# Patient Record
Sex: FEMALE | Race: WHITE | NOT HISPANIC OR LATINO | Employment: FULL TIME | URBAN - METROPOLITAN AREA
[De-identification: names, ages, dates, MRNs, and addresses within clinical notes are randomized per-mention and may not be internally consistent; named-entity substitution may affect disease eponyms.]

---

## 2024-01-10 ENCOUNTER — TELEMEDICINE (OUTPATIENT)
Dept: BEHAVIORAL HEALTH | Facility: CLINIC | Age: 44
End: 2024-01-10
Payer: COMMERCIAL

## 2024-01-10 DIAGNOSIS — F32.1 CURRENT MODERATE EPISODE OF MAJOR DEPRESSIVE DISORDER, UNSPECIFIED WHETHER RECURRENT (MULTI): Primary | ICD-10-CM

## 2024-01-10 PROCEDURE — 90837 PSYTX W PT 60 MINUTES: CPT | Performed by: PSYCHOLOGIST

## 2024-01-10 NOTE — PROGRESS NOTES
START TIME:1:05p  END TIME: 2p  TOTAL TIME FACE TO FACE: 55mins    Subjective   Patient ID: Tatum Lala is a 43 y.o. female who presents for   Problem List Items Addressed This Visit       Current moderate episode of major depressive disorder (CMS/HCC) - Primary   .    Virtual or Telephone Consent    An interactive audio and video telecommunication system which permits real time communications between the patient (at the originating site) and provider (at the distant site) was utilized to provide this telehealth service.   Verbal consent was requested and obtained from Tatum Lala on this date, 01/10/24 for a telehealth visit.      CONTENT OF SESSION: this was followup appointment (session 2). Previous session was a couples visit for psychological consult related to third party reproduction.  Tatum was present alone this session.  Focus of session was on her presenting concern of coping with infertility and associated treatment.  She had her tenth round of IVF; and did not proceed to transfer. She and Jaspal are now considering either no further pursuit of infertility treatment or using donor egg.    Tatum reported symptoms consistent with major depressive disorder, moderate. She denied any suicidal ideation or intent. She reported experiencing persistent and pervasive low mood, sadness, anhedonia, avolition, and low energy. She has not been engaging in activities she usually would (e.g., seeing friends) and has been napping, which is outside the norm for her. These symptoms appear beyond grief reaction to infertility treatment.     She accepted referral to psychiatry division for medication consult.    Provider gave psychoeducation on values guided action and the behavioral activation model from CBT for depression (handouts provided).    Objective   Social History     Substance and Sexual Activity   Alcohol Use Not on file      Social History     Social History Narrative    Not on file        Assessment/Plan    Problem List Items Addressed This Visit       Current moderate episode of major depressive disorder (CMS/HCC) - Primary     PLAN: Followup in 2-4 weeks for cbt for depression and coping with infertility.  Referral placed to Dr. Percy Gustafson for medication consult/management.  PRACTICE EXERCISES PROVIDED: read handout provided on behavioral activation. Read chapter provided. Complete values card sort.

## 2024-01-29 ENCOUNTER — TELEMEDICINE (OUTPATIENT)
Dept: BEHAVIORAL HEALTH | Facility: CLINIC | Age: 44
End: 2024-01-29
Payer: COMMERCIAL

## 2024-01-29 DIAGNOSIS — F32.1 CURRENT MODERATE EPISODE OF MAJOR DEPRESSIVE DISORDER, UNSPECIFIED WHETHER RECURRENT (MULTI): Primary | ICD-10-CM

## 2024-01-29 PROCEDURE — 90837 PSYTX W PT 60 MINUTES: CPT | Mod: 95 | Performed by: PSYCHOLOGIST

## 2024-01-29 NOTE — PROGRESS NOTES
START TIME: 11:07  END TIME: 12:02p  TOTAL TIME FACE TO FACE: 55min    Subjective   Patient ID: Tatum Lala is a 43 y.o. female who presents for   Problem List Items Addressed This Visit       Current moderate episode of major depressive disorder (CMS/HCC) - Primary   .    Virtual or Telephone Consent    An interactive audio and video telecommunication system which permits real time communications between the patient (at the originating site) and provider (at the distant site) was utilized to provide this telehealth service.   Verbal consent was requested and obtained from Tatum Lala on this date, 01/29/24 for a telehealth visit.      CONTENT OF SESSION: this was followup appointment (session 3). This was individual CBT session. Focus of session was on Tatum's adjustment in context of stressors including infertility and associated treatment.  She discussed her completion of the values card sort exercise provided previous session - identified inner peace and health as two top values. Time was also spent on discussion of interpersonal relationships. Provider gave supportive counseling and normalized common emotional experiences. Provider gave psychoeducation on assertive communication including DEAR MAN model (hand out provided).    Objective   Social History     Substance and Sexual Activity   Alcohol Use Not on file      Social History     Social History Narrative    Not on file        Assessment/Plan   Problem List Items Addressed This Visit       Current moderate episode of major depressive disorder (CMS/HCC) - Primary     PLAN: Followup in 2-4 weeks for cbt for depression and coping with infertility.  Referral placed to Dr. Percy Gustafson for medication consult/management.  PRACTICE EXERCISES PROVIDED: read handout provided on behavioral activation. Read chapter provided. Complete values card sort. Read handout on DEAR MAN communication model.

## 2024-01-31 ENCOUNTER — TELEMEDICINE (OUTPATIENT)
Dept: BEHAVIORAL HEALTH | Facility: CLINIC | Age: 44
End: 2024-01-31
Payer: COMMERCIAL

## 2024-01-31 DIAGNOSIS — F32.81 PMDD (PREMENSTRUAL DYSPHORIC DISORDER): ICD-10-CM

## 2024-01-31 DIAGNOSIS — F43.10 PTSD (POST-TRAUMATIC STRESS DISORDER): ICD-10-CM

## 2024-01-31 PROCEDURE — 99204 OFFICE O/P NEW MOD 45 MIN: CPT | Performed by: STUDENT IN AN ORGANIZED HEALTH CARE EDUCATION/TRAINING PROGRAM

## 2024-01-31 RX ORDER — FLUOXETINE HYDROCHLORIDE 20 MG/1
20 CAPSULE ORAL DAILY
Qty: 30 CAPSULE | Refills: 1 | Status: SHIPPED | OUTPATIENT
Start: 2024-01-31 | End: 2024-02-23

## 2024-01-31 NOTE — PROGRESS NOTES
Subjective    All Individuals Present: Patient and Provider (Encounter Provider)     ID: Tatum Lala is a 43 y.o. female presenting for consultation for PMDD.     Interval History/HPI/PFSH:     woman presenting after multiple rounds of unsuccessful IVF.    Significant frustration, anxiety, guilt around inability to carry a pregnancy to term.    Has never seen psychiatry before, only recently began seeing Dr. Blackwell for therapy through [facility]. Has only ever been on PRN alprazolam for specific situations before as prescribed by PCP. Never took with regularity because was too sedating. Otherwise has never had psychiatric treatment.      PMS:  -clomid made feel SI  -estrace felt beneficial to mood  -had migraines without aura on combo OCPs    -premenstrually  -bad HA the day before  -5 days before is when irritability starts, resolves day after period  -previously tried tea Women's Moon Cycle, helped slightly  -only cramps, otherwise minimal physical sx    LMP ended 24  Uses Aura ring to help cycle; uses Huey          [facility] History:  1st stim- cancelled      1st Retrieval 10/21: (Agonist Flare) 5 retrieved, 3 mature, 2 fertilized, 1 blast fzâ€“ no pgt-a  Egg Quality: no notations made     FZET  of 1 blast from above retrieval, NPG     2nd Retrieval : (Antagonist Suppression) 6 Retriefved, 4 mature, 3 fertilized, 1 blast fz- no pgt-a  Egg Quality: Multiple fragmented PBs in some oocytes; misshapen oocytes     3rd Retrieval : (Agonist Flare) 5 retrieved, 3 mature, 2 fertilized, 1 BL â€“ no pgt-a     FZET  â€“ Chemical PG of 2 above blasts fz     4th Retrieval : (Antagonist Suppression) 4 Retrieved, 0 mature eggs, in-vitro matured for Day 2 ICSI 3 eggs, 2 fertilized, 0 blasts  Egg Quality: D2 ICSi Per RW and Patient at 1037. Fragile Membranes; mishapen oocytes.      5th Retrieval : (Antagonist Suppression) 3 Retrieved, 0 mature eggs at stripping, 1 matured in the afternoon  for ICSI, no fertilization  Egg Quality: immature eggs, no notations d/t maturity     6th Retrieval 10/22: (Antagonist Suppression) 4 Retrieved, 3 fertilized, 2 Aneuploid   Egg Quality: Degenerated fragmented PBs; grainy dark eggs     7th retrieval 01/23: (Antagonist Suppression) 3 retrieved, 3 fertilized, 1 Aneuploid  Egg Quality: Grainy Oocytes, Large PB w/ Fragmentation. Debris in PVS,     8th Retrieval: 01/23: (Antagonist Suppression) 3 retrieved, 1 fertilized , 0 Blasts  Egg Quality : Mishapen salvatore's, vacuoles, dark eggs, grainy eggs, large PVS, fragile membranes     Total stims: 9  Total retrievals: 8  Total transfers: 2  Total embryos transferred: 3 (untested)  Total embryos biopsied for PGTA: 3 (all aneuploid)     Medication side effects: None Reported    Denies SI, HI, AVH.     Review of Systems  Constitutional: Negative  Psychiatric: Positive for anxiety, irritability, and mood swings, Negative for decreased appetite, depression, excessive alcohol consumption, illegal drug usage, learning difficulty, and loss of interest in favorite activities  Neurological: Negative   Other: other systems reviewed and non-contributory    Past Psychiatric Hx:  -has had alprazolam before for situational panic; made sleepy  -otherwise no diagnoses, treatment  -no hospitalizations  -no suicide attempts nor self-harm    Past Medical Hx:  -H/O HPV, Ovarian cyst, Fibrocystic breasts, chronic UTI     Surgical Hx:  -Cone biopsy, ovarian cystectomy, breast cyst removed     Allergies:  -  Allergies   Allergen Reactions    Hazelnut Unknown        Family Medical History:  No family history on file.   -dad with hypothyroidism, CAD  -mom with breast cancer    Social:  Social History     Socioeconomic History    Marital status:      Spouse name: Not on file    Number of children: Not on file    Years of education: Not on file    Highest education level: Not on file   Occupational History    Not on file   Tobacco Use    Smoking  status: Not on file    Smokeless tobacco: Not on file   Substance and Sexual Activity    Alcohol use: Not on file    Drug use: Not on file    Sexual activity: Not on file   Other Topics Concern    Not on file   Social History Narrative    Not on file     Social Determinants of Health     Financial Resource Strain: Low Risk  (5/27/2023)    Received from [facility]    Overall Financial Resource Strain (CARDIA)     Difficulty of Paying Living Expenses: Not hard at all   Food Insecurity: No Food Insecurity (5/27/2023)    Received from [facility]    Hunger Vital Sign     Worried About Running Out of Food in the Last Year: Never true     Ran Out of Food in the Last Year: Never true   Transportation Needs: No Transportation Needs (5/27/2023)    Received from [facility]    PRAPARE - Transportation     Lack of Transportation (Medical): No     Lack of Transportation (Non-Medical): No   Physical Activity: Sufficiently Active (5/27/2023)    Received from [facility]    Exercise Vital Sign     Days of Exercise per Week: 5 days     Minutes of Exercise per Session: 40 min   Stress: No Stress Concern Present (5/27/2023)    Received from [facility]    [employer] [employer] - Occupational Stress Questionnaire     Feeling of Stress : Not at all   Social Connections: Moderately Integrated (5/27/2023)    Received from [facility]    Social Connection and Isolation Panel [NHANES]     Frequency of Communication with Friends and Family: More than three times a week     Frequency of Social Gatherings with Friends and Family: More than three times a week     Attends Ep[employer]opalian Services: Never     Active Member of Clubs or Organizations: Yes     Attends Club or Organization Meetings: More than 4 times per year     Marital Status: Living with partner   Intimate Partner Violence: Not on file   Housing Stability: Low Risk  (5/27/2023)    Received from [facility]    Housing Stability Vital  "Sign     Unable to Pay for Housing in the Last Year: No     Number of Places Lived in the Last Year: 1     Unstable Housing in the Last Year: No      Splits times between [place] and [place].    Works as an . Post-graduate education.    Substances:  -tobacco/nicotine: never  -EtOH: occasional, social, no binging nor repeat blackouts  -Illicits: none  -Caffeine: occasional      Objective   There were no vitals taken for this visit.  Wt Readings from Last 4 Encounters:   02/20/23 60.8 kg (134 lb)   01/21/23 60.9 kg (134 lb 4.2 oz)   01/10/23 61.1 kg (134 lb 11.2 oz)   10/11/22 61 kg (134 lb 7.7 oz)       Mental Status Exam  General Appearance: Well groomed, appropriate eye contact.  Attitude/Behavior: Cooperative, conversant, engaged, and with good eye contact.  Motor: No psychomotor agitation or retardation, no tremor or other abnormal movements.  Speech: Normal rate, volume, prosody  Gait/Station: Within normal limits  Mood: \"okay.\"  Affect: Euthymic, full-range and Congruent with mood and topic of conversation  Thought Process: Linear, goal directed  Thought Associations: No loosening of associations  Thought Content: normal  Sensorium: Alert and oriented to person, place, time and situation  Insight: Intact, as evidenced by awareness of symptom patterns  Judgment: Intact  Cognition: Cognitively intact to conversational testing with respect to attention, orientation, fund of knowledge, recent and remote memory, and language.  Testing: N/A.    Laboratory/Imaging/Diagnostic Tests       Assessment/Plan   Overall Formulation and Differential Diagnosis:  Tatum Laal is a 43 y.o. female who meets criteria for PMDD, provisional based on retrospective data. Will acquire prospective mood tracking data to help confirm diagnosis.    Interval Assessment:  Will try fluoxetine for PMDD symptoms.    -start fluoxetine 20 mg PO daily  -start prospectively mood tracking  -RTC 4 weeks    Risk Assessment:  Imminent Risk " of Suicide or Serious Self-Injury: Low Risk -- Risk factors include: Loss (relational, social, occupational, financial)  and Medical illness comorbidity  Protective factors include:Denies current suicidal ideation, Denies history of suicide attempts , Future-oriented talk , Willingness to seek help and support , Skills in problem solving, conflict resolution, and nonviolent handling of disputes, Cultural and Christianity beliefs that discourage suicide and support self-preservation , Access to a variety of clinical interventions , Receiving and engaged in care for mental, physical, and substance use disorders , History of adhering to treatment recommendations and/or prescribed medication regimen , Support through ongoing medical and mental healthcare relationships , Current/history of good response to treatment/meds , Interpersonal relationships and supports, e.g., family, friends, peers, community , and Restricted access to firearms or other lethal means of suicide   Imminent Risk of Violence or Homicide: Low Risk - Risk factors include: No significant risk factors identified on screening. Protective factors include: Lack of known history of harm to others , Lack of known history of violent ideation , Lack of known access to firearms , Sense of community, availability/access to resources and support , Sense of optimism, hope , Interpersonal competence , Affect regulation , Sense of self-efficacy, internal locus of control , and Positive, pro-social family/peer network   Treatment Plan:  There are no recently modified care plans to display for this patient.      Attestation Statements   Topics (in addition those noted above): Diagnostic impressions, Instructions for treatment , Patient education , Prognosis , Risks and benefits of treatments , and Side effects of medications

## 2024-02-13 ENCOUNTER — TELEMEDICINE (OUTPATIENT)
Dept: BEHAVIORAL HEALTH | Facility: CLINIC | Age: 44
End: 2024-02-13
Payer: COMMERCIAL

## 2024-02-13 DIAGNOSIS — F32.1 CURRENT MODERATE EPISODE OF MAJOR DEPRESSIVE DISORDER, UNSPECIFIED WHETHER RECURRENT (MULTI): Primary | ICD-10-CM

## 2024-02-13 PROCEDURE — 90837 PSYTX W PT 60 MINUTES: CPT | Mod: 95 | Performed by: PSYCHOLOGIST

## 2024-02-13 NOTE — PROGRESS NOTES
START TIME: 8:03  END TIME: 9  TOTAL TIME FACE TO FACE: 57 minutes    Subjective   Patient ID: Tatum Lala is a 43 y.o. female who presents for   Problem List Items Addressed This Visit       Current moderate episode of major depressive disorder (CMS/HCC) - Primary   .    Virtual or Telephone Consent    An interactive audio and video telecommunication system which permits real time communications between the patient (at the originating site) and provider (at the distant site) was utilized to provide this telehealth service.   Verbal consent was requested and obtained from Tatum Lala on this date, 02/13/24 for a telehealth visit.      CONTENT OF SESSION:  this was followup appointment (session 4). This was individual CBT session. Focus of session was on Tatum's adjustment in context of stressors including infertility and associated treatment.  Tatum described her use of recent assertive communication and boundary setting and provider gave positive feedback about this skill use. Time was also spent on discussion of Tatum's decision making and she identified relevant personal options for solutions, pros/cons, and relevant values. Provider gave supportive counseling and normalized common emotional experiences. Provider gave psychoeducation on common psychological reactions to infertility and adjustment in parenthood.       Objective   Social History     Substance and Sexual Activity   Alcohol Use Not on file      Social History     Social History Narrative    Not on file        Assessment/Plan   Problem List Items Addressed This Visit       Current moderate episode of major depressive disorder (CMS/HCC) - Primary     PLAN: Followup in 2-4 weeks for cbt for depression and coping with infertility.  Referral placed to Dr. Percy Gustafson for medication consult/management.  PRACTICE EXERCISES PROVIDED: read handout provided on behavioral activation. Read chapter provided. Complete values card sort. Read handout on DEAR  MAN communication model.

## 2024-02-22 DIAGNOSIS — F43.10 PTSD (POST-TRAUMATIC STRESS DISORDER): ICD-10-CM

## 2024-02-23 RX ORDER — FLUOXETINE HYDROCHLORIDE 20 MG/1
20 CAPSULE ORAL DAILY
Qty: 90 CAPSULE | Refills: 1 | Status: SHIPPED | OUTPATIENT
Start: 2024-02-23 | End: 2024-05-13 | Stop reason: SINTOL

## 2024-03-01 ENCOUNTER — APPOINTMENT (OUTPATIENT)
Dept: BEHAVIORAL HEALTH | Facility: CLINIC | Age: 44
End: 2024-03-01
Payer: COMMERCIAL

## 2024-03-06 ENCOUNTER — TELEMEDICINE (OUTPATIENT)
Dept: BEHAVIORAL HEALTH | Facility: CLINIC | Age: 44
End: 2024-03-06
Payer: COMMERCIAL

## 2024-03-06 DIAGNOSIS — F32.81 PMDD (PREMENSTRUAL DYSPHORIC DISORDER): ICD-10-CM

## 2024-03-06 DIAGNOSIS — F43.10 PTSD (POST-TRAUMATIC STRESS DISORDER): ICD-10-CM

## 2024-03-06 PROCEDURE — 99214 OFFICE O/P EST MOD 30 MIN: CPT | Performed by: STUDENT IN AN ORGANIZED HEALTH CARE EDUCATION/TRAINING PROGRAM

## 2024-03-06 RX ORDER — ALPRAZOLAM 0.5 MG/1
0.5 TABLET ORAL 3 TIMES DAILY PRN
Qty: 10 TABLET | Refills: 2 | Status: SHIPPED | OUTPATIENT
Start: 2024-03-06 | End: 2024-06-10 | Stop reason: SDUPTHER

## 2024-03-06 NOTE — PROGRESS NOTES
Subjective    All Individuals Present: Patient and Provider (Encounter Provider)     ID: Tatum Lala is a 43 y.o. female presenting for consultation for PMDD.     Interval History/HPI/PFSH:  Started prozac day 18 of cycle, got menses day 27.    Did get diarrhea from fluoxetine. Switched dosing to night, has woken up every night ~2:00 a.m. Sleep resolved immediately when stopped taking.    Felt normal for the days she took fluoxetine and no mood fluctuations, wants to stay on.    Stress with 3 senior dogs, 2 having major surgery in past 2 weeks. Had to use alprazolam 0.5 mg, slept extremely well.    Traveling to [facility] in a few weeks.      PMS:  -clomid made feel SI  -estrace felt beneficial to mood  -had migraines without aura on combo OCPs    -premenstrually  -bad HA the day before  -5 days before is when irritability starts, resolves day after period  -previously tried tea Women's Moon Cycle, helped slightly  -only cramps, otherwise minimal physical sx    LMP ended 1/29/24  Uses Aura ring to help cycle; uses Huey       Medication side effects:  diarrhea, insomnia    Denies SI, HI, AVH.     Review of Systems  Constitutional: Negative  Psychiatric: Positive for anxiety, irritability, and mood swings, Negative for decreased appetite, depression, excessive alcohol consumption, illegal drug usage, learning difficulty, and loss of interest in favorite activities  Neurological: Negative   Other: other systems reviewed and non-contributory    Objective   There were no vitals taken for this visit.  Wt Readings from Last 4 Encounters:   02/20/23 60.8 kg (134 lb)   01/21/23 60.9 kg (134 lb 4.2 oz)   01/10/23 61.1 kg (134 lb 11.2 oz)   10/11/22 61 kg (134 lb 7.7 oz)       Mental Status Exam  General Appearance: Well groomed, appropriate eye contact.  Attitude/Behavior: Cooperative, conversant, engaged, and with good eye contact.  Motor: No psychomotor agitation or retardation, no tremor or other abnormal  "movements.  Speech: Normal rate, volume, prosody  Gait/Station: Within normal limits  Mood: \"normal\"  Affect: Euthymic, full-range and Congruent with mood and topic of conversation  Thought Process: Linear, goal directed  Thought Associations: No loosening of associations  Thought Content: normal  Sensorium: Alert and oriented to person, place, time and situation  Insight: Intact, as evidenced by awareness of symptom patterns  Judgment: Intact  Cognition: Cognitively intact to conversational testing with respect to attention, orientation, fund of knowledge, recent and remote memory, and language.  Testing: N/A.    Laboratory/Imaging/Diagnostic Tests       Assessment/Plan   Overall Formulation and Differential Diagnosis:  Tatum Lala is a 43 y.o. female who meets criteria for PMDD, provisional based on retrospective data. Will acquire prospective mood tracking data to help confirm diagnosis.    Interval Assessment:  Will try fluoxetine for PMDD symptoms, switch dosing to morning to see if improves sleep.    -continue fluoxetine 20 mg PO daily, switch dosing to morning  -continue prospectively mood tracking  -RTC 4 weeks    Risk Assessment:  Imminent Risk of Suicide or Serious Self-Injury: Low Risk -- Risk factors include: Loss (relational, social, occupational, financial)  and Medical illness comorbidity  Protective factors include:Denies current suicidal ideation, Denies history of suicide attempts , Future-oriented talk , Willingness to seek help and support , Skills in problem solving, conflict resolution, and nonviolent handling of disputes, Cultural and Taoist beliefs that discourage suicide and support self-preservation , Access to a variety of clinical interventions , Receiving and engaged in care for mental, physical, and substance use disorders , History of adhering to treatment recommendations and/or prescribed medication regimen , Support through ongoing medical and mental healthcare relationships , " Current/history of good response to treatment/meds , Interpersonal relationships and supports, e.g., family, friends, peers, community , and Restricted access to firearms or other lethal means of suicide   Imminent Risk of Violence or Homicide: Low Risk - Risk factors include: No significant risk factors identified on screening. Protective factors include: Lack of known history of harm to others , Lack of known history of violent ideation , Lack of known access to firearms , Sense of community, availability/access to resources and support , Sense of optimism, hope , Interpersonal competence , Affect regulation , Sense of self-efficacy, internal locus of control , and Positive, pro-social family/peer network   Treatment Plan:  There are no recently modified care plans to display for this patient.      Attestation Statements   Topics (in addition those noted above): Diagnostic impressions, Instructions for treatment , Patient education , Prognosis , Risks and benefits of treatments , and Side effects of medications

## 2024-03-28 ENCOUNTER — TELEMEDICINE (OUTPATIENT)
Dept: BEHAVIORAL HEALTH | Facility: CLINIC | Age: 44
End: 2024-03-28
Payer: COMMERCIAL

## 2024-03-28 DIAGNOSIS — F32.1 CURRENT MODERATE EPISODE OF MAJOR DEPRESSIVE DISORDER, UNSPECIFIED WHETHER RECURRENT (MULTI): Primary | ICD-10-CM

## 2024-03-28 PROCEDURE — 90837 PSYTX W PT 60 MINUTES: CPT | Performed by: PSYCHOLOGIST

## 2024-03-28 NOTE — PROGRESS NOTES
START TIME: 11:05  END TIME: 12  TOTAL TIME FACE TO FACE: 55mins    Subjective   Patient ID: Tatum Lala is a 43 y.o. female who presents for   Problem List Items Addressed This Visit       Current moderate episode of major depressive disorder (CMS/HCC) - Primary   .    Virtual or Telephone Consent    An interactive audio and video telecommunication system which permits real time communications between the patient (at the originating site) and provider (at the distant site) was utilized to provide this telehealth service.   Verbal consent was requested and obtained from Tatum Lala on this date, 03/28/24 for a telehealth visit.      CONTENT OF SESSION: this was followup appointment (session 5). This was individual CBT session.   Focus of session was on Tatum's adjustment in context of impact of infertility and associated treatment. Tatum discussed her experience with normative grief response related to infertility treatment and with the idea of potentially not being able to use her own eggs. She acknowledged some worry thoughts; Provider used Socratic questioning to facilitate cognitive restructuring of potential unrealistic or unhelpful cognitions and patient was able to engage in cognitive restructuring. Provider also gave further psychoeducation on common psychological reactions to infertility treatment. Time was also spent discussing possible strategies of cognitive restructuring of unhelpful or negative cognitions and Tatum was able to identify alternatives.  Provider gave supportive counseling and normalized common emotional experiences.  Objective   Social History     Substance and Sexual Activity   Alcohol Use Not on file      Social History     Social History Narrative    Not on file        Assessment/Plan   Problem List Items Addressed This Visit       Current moderate episode of major depressive disorder (CMS/HCC) - Primary     PLAN: Followup in 2-4 weeks for cbt for depression and coping with  infertility.  Referral placed to Dr. Percy Gustafson for medication consult/management.  PRACTICE EXERCISES PROVIDED: read handout provided on behavioral activation. Read chapter provided. Complete values card sort. Read handout on DEAR MAN communication model.

## 2024-04-12 ENCOUNTER — APPOINTMENT (OUTPATIENT)
Dept: BEHAVIORAL HEALTH | Facility: CLINIC | Age: 44
End: 2024-04-12
Payer: COMMERCIAL

## 2024-05-13 DIAGNOSIS — F43.10 PTSD (POST-TRAUMATIC STRESS DISORDER): ICD-10-CM

## 2024-05-13 RX ORDER — FLUOXETINE 10 MG/1
10 CAPSULE ORAL DAILY
Qty: 30 CAPSULE | Refills: 1 | Status: SHIPPED | OUTPATIENT
Start: 2024-05-13 | End: 2024-06-10 | Stop reason: SDUPTHER

## 2024-06-08 DIAGNOSIS — F43.10 PTSD (POST-TRAUMATIC STRESS DISORDER): ICD-10-CM

## 2024-06-10 DIAGNOSIS — F43.10 PTSD (POST-TRAUMATIC STRESS DISORDER): ICD-10-CM

## 2024-06-10 RX ORDER — ALPRAZOLAM 0.5 MG/1
0.5 TABLET ORAL 3 TIMES DAILY PRN
Qty: 10 TABLET | Refills: 2 | Status: SHIPPED | OUTPATIENT
Start: 2024-06-10 | End: 2024-09-08

## 2024-06-10 RX ORDER — FLUOXETINE 10 MG/1
10 CAPSULE ORAL DAILY
Qty: 90 CAPSULE | Refills: 1 | OUTPATIENT
Start: 2024-06-10

## 2024-06-10 RX ORDER — FLUOXETINE 10 MG/1
10 CAPSULE ORAL DAILY
Qty: 90 CAPSULE | Refills: 3 | Status: SHIPPED | OUTPATIENT
Start: 2024-06-10 | End: 2025-06-10

## 2024-07-09 ENCOUNTER — TELEMEDICINE (OUTPATIENT)
Dept: BEHAVIORAL HEALTH | Facility: CLINIC | Age: 44
End: 2024-07-09
Payer: COMMERCIAL

## 2024-07-09 DIAGNOSIS — F32.1 CURRENT MODERATE EPISODE OF MAJOR DEPRESSIVE DISORDER, UNSPECIFIED WHETHER RECURRENT (MULTI): Primary | ICD-10-CM

## 2024-07-09 PROCEDURE — 90837 PSYTX W PT 60 MINUTES: CPT | Performed by: PSYCHOLOGIST

## 2024-07-09 NOTE — PROGRESS NOTES
START TIME: 8:05  END TIME: 9  TOTAL TIME FACE TO FACE: 55mins    Subjective   Patient ID: Tatum Lala is a 43 y.o. female who presents for   Problem List Items Addressed This Visit       Current moderate episode of major depressive disorder (Multi) - Primary   .    Virtual or Telephone Consent    An interactive audio and video telecommunication system which permits real time communications between the patient (at the originating site) and provider (at the distant site) was utilized to provide this telehealth service.   Verbal consent was requested and obtained from Tatum Lala on this date, 07/09/24 for a telehealth visit.      CONTENT OF SESSION: this was followup appointment (session 5). This was individual CBT session.   Focus of session was on Tatum's adjustment in context of impact of infertility and associated treatment, and associated depressive symptoms. Tatum reported overall positive response to intermittent fluoxetine managed by Dr. Gustafson for PMDD symptoms. Tatum noted some occasional difficulty sleeping (difficulty staying asleep) and provider gave psychoeducation on sleep hygiene skills. Tatum reported some distress in context of overall psychosocial stress (e.g., moving; major work events) and interpersonal stress. She described appropriate use of coping skills such as cognitive reframing and assertive communication skills. Provider gave positive feedback about this skill use. Time was also spent on problem solving around implementation of behavioral activation skills related to Tatum's goal to schedule pleasant events to look forward to. Provider gave supportive counseling and normalized common emotional experiences.    Objective   Social History     Substance and Sexual Activity   Alcohol Use Not on file      Social History     Social History Narrative    Not on file        Assessment/Plan   Problem List Items Addressed This Visit       Current moderate episode of major depressive disorder  (Multi) - Primary     PLAN: Followup in 2-4 weeks for cbt for depression and coping with infertility.  Referral placed to Dr. Percy Gustafson for medication consult/management.  PRACTICE EXERCISES PROVIDED: read handout provided on behavioral activation. Read chapter provided. Complete values card sort. Read handout on DEAR MAN communication model.

## 2024-08-05 ENCOUNTER — TELEMEDICINE (OUTPATIENT)
Dept: BEHAVIORAL HEALTH | Facility: CLINIC | Age: 44
End: 2024-08-05
Payer: COMMERCIAL

## 2024-08-05 DIAGNOSIS — F32.1 CURRENT MODERATE EPISODE OF MAJOR DEPRESSIVE DISORDER, UNSPECIFIED WHETHER RECURRENT (MULTI): Primary | ICD-10-CM

## 2024-08-05 PROCEDURE — 90834 PSYTX W PT 45 MINUTES: CPT | Performed by: PSYCHOLOGIST

## 2024-08-05 NOTE — PROGRESS NOTES
START TIME: 8:05  END TIME: 8:47  TOTAL TIME FACE TO FACE: 42 mins    Subjective   Patient ID: Tatum Lala is a 43 y.o. female who presents for   Problem List Items Addressed This Visit       Current moderate episode of major depressive disorder (Multi) - Primary   .    Virtual or Telephone Consent    An interactive audio and video telecommunication system which permits real time communications between the patient (at the originating site) and provider (at the distant site) was utilized to provide this telehealth service.   Verbal consent was requested and obtained from Tatum Lala on this date, 08/05/24 for a telehealth visit.      CONTENT OF SESSION: this was followup appointment (session 6). This was individual CBT session.   Focus of session was on Tatum's adjustment in context of impact of infertility and associated treatment, and associated depressive symptoms. Tatum reported overall positive response to intermittent fluoxetine managed by Dr. Gustafson for PMDD symptoms. Tatum noted some occasional difficulty sleeping (difficulty staying asleep) and provider gave psychoeducation on sleep hygiene skills- Tatum has been practicing minimizing time in bed and not asleep. She is still wakening earlier than desired.  She and Jaspal are moving into a new home soon.   Time was spent on emotional processing of recent interpersonal experience and trigger/reminder of infertility and related distress and reproductive loss. Time was spent on problem solving around strategies for further boundary setting. Provider gave psychoeduccation on role of cognition in mood and on rumination- discussed cognitive defusion and healthy distraction and mindfulness exercises to minimize rumination. Provider gave psychoeducation on common psychological reactions to infertility and reproductive loss.  Provider gave supportive counseling and normalized common emotional experiences.    Objective   Social History     Substance and Sexual  Activity   Alcohol Use Not on file      Social History     Social History Narrative    Not on file        Assessment/Plan   Problem List Items Addressed This Visit       Current moderate episode of major depressive disorder (Multi) - Primary     PLAN: Followup in 2-4 weeks for cbt for depression and coping with infertility.  Referral placed to Dr. Percy Gustafson for medication consult/management.  PRACTICE EXERCISES PROVIDED: read handout provided on behavioral activation. Read chapter provided. Complete values card sort. Read handout on DEAR MAN communication model.

## 2024-08-16 DIAGNOSIS — F32.81 PMDD (PREMENSTRUAL DYSPHORIC DISORDER): ICD-10-CM

## 2024-08-26 ENCOUNTER — TELEMEDICINE (OUTPATIENT)
Dept: BEHAVIORAL HEALTH | Facility: CLINIC | Age: 44
End: 2024-08-26
Payer: COMMERCIAL

## 2024-08-26 DIAGNOSIS — F32.1 CURRENT MODERATE EPISODE OF MAJOR DEPRESSIVE DISORDER, UNSPECIFIED WHETHER RECURRENT (MULTI): Primary | ICD-10-CM

## 2024-08-26 PROCEDURE — 90834 PSYTX W PT 45 MINUTES: CPT | Performed by: PSYCHOLOGIST

## 2024-08-26 NOTE — PROGRESS NOTES
START TIME: 8:10  END TIME: 9  TOTAL TIME FACE TO FACE: 50min    Subjective   Patient ID: Tatum Lala is a 43 y.o. female who presents for   Problem List Items Addressed This Visit       Current moderate episode of major depressive disorder (Multi) - Primary   .    Virtual or Telephone Consent    An interactive audio and video telecommunication system which permits real time communications between the patient (at the originating site) and provider (at the distant site) was utilized to provide this telehealth service.   Verbal consent was requested and obtained from Tatum Lala on this date, 08/26/24 for a telehealth visit.      CONTENT OF SESSION:  this was followup appointment (session 7). This was individual CBT session.   Focus of session was on Tatum's adjustment in context of impact of infertility and associated treatment, and associated depressive symptoms.   She has noticed symptoms she suspects may be perimenopause-related, including increased mood changes and body temperature regulation difficulties. Provider made referral to Justo Medrano for further evaluation. Time was also spent on emotional processing of impact of infertility on relationships- Tatum discussed recent experience of social disclosure of her infertility treatment history and had received some validating responses. Time was also spent on role play of assertive communication. Provider gave psychoeducation on common emotional and social reactions to infertility/infertility treatment.  Provider gave supportive counseling and normalized common emotional experiences.    Objective   Social History     Substance and Sexual Activity   Alcohol Use Not on file      Social History     Social History Narrative    Not on file        Assessment/Plan   Problem List Items Addressed This Visit       Current moderate episode of major depressive disorder (Multi) - Primary     PLAN: Followup in 2-4 weeks for cbt for depression and coping with infertility.   Referral placed to Dr. Percy Gustafson for medication consult/management.  PRACTICE EXERCISES PROVIDED: read handout provided on behavioral activation. Read chapter provided. Complete values card sort. Read handout on DEAR MAN communication model.

## 2024-09-09 ENCOUNTER — TELEMEDICINE (OUTPATIENT)
Dept: BEHAVIORAL HEALTH | Facility: CLINIC | Age: 44
End: 2024-09-09
Payer: COMMERCIAL

## 2024-09-09 DIAGNOSIS — F32.1 CURRENT MODERATE EPISODE OF MAJOR DEPRESSIVE DISORDER, UNSPECIFIED WHETHER RECURRENT (MULTI): Primary | ICD-10-CM

## 2024-09-09 PROCEDURE — 90834 PSYTX W PT 45 MINUTES: CPT | Performed by: PSYCHOLOGIST

## 2024-09-09 NOTE — PROGRESS NOTES
START TIME: 8:05  END TIME: 8:50  TOTAL TIME FACE TO FACE: 45    Subjective   Patient ID: Tatum Lala is a 43 y.o. female who presents for   Problem List Items Addressed This Visit       Current moderate episode of major depressive disorder (Multi) - Primary   .    Virtual or Telephone Consent    An interactive audio and video telecommunication system which permits real time communications between the patient (at the originating site) and provider (at the distant site) was utilized to provide this telehealth service.   Verbal consent was requested and obtained from Tatum Llaa on this date, 09/09/24 for a telehealth visit.      CONTENT OF SESSION:    this was followup appointment (session 8). This was individual CBT session.   Focus of session was on Tatum's adjustment in context of impact of infertility and associated treatment, and associated depressive symptoms.   Time was spent on emotional processing of impact of infertility and infertility treatment, and Tatum and Jaspal potentially seeking IVF using donor egg. She and Jaspal have been talking more about seeking donor egg IVF. They are moving into new home later this month.  Tatum identified sources of potential social support and a cope-ahead plan for coping with general stress of infertility treatment. She also described appropriate use of assertive communication and limit setting. Provider gave psychoeducation on common emotional and social reactions to infertility/infertility treatment. Provider gave supportive counseling and normalized common emotional experiences.    Objective   Social History     Substance and Sexual Activity   Alcohol Use Not on file      Social History     Social History Narrative    Not on file        Assessment/Plan   Problem List Items Addressed This Visit       Current moderate episode of major depressive disorder (Multi) - Primary     PLAN: Followup in 2-4 weeks for cbt for depression and coping with infertility.  Referral  placed to Dr. Percy Gustafson for medication consult/management.  PRACTICE EXERCISES PROVIDED: read handout provided on behavioral activation. Read chapter provided. Complete values card sort. Read handout on DEAR MAN communication model.

## 2024-09-27 ENCOUNTER — APPOINTMENT (OUTPATIENT)
Dept: BEHAVIORAL HEALTH | Facility: CLINIC | Age: 44
End: 2024-09-27
Payer: COMMERCIAL

## 2024-09-27 DIAGNOSIS — F32.1 CURRENT MODERATE EPISODE OF MAJOR DEPRESSIVE DISORDER, UNSPECIFIED WHETHER RECURRENT (MULTI): Primary | ICD-10-CM

## 2024-09-27 PROCEDURE — 90837 PSYTX W PT 60 MINUTES: CPT | Performed by: PSYCHOLOGIST

## 2024-09-27 NOTE — PROGRESS NOTES
START TIME: 1:05   END TIME: 2  TOTAL TIME FACE TO FACE: 55mins    Subjective   Patient ID: Tatum Lala is a 43 y.o. female who presents for   Problem List Items Addressed This Visit       Current moderate episode of major depressive disorder (Multi) - Primary   .    Virtual or Telephone Consent    An interactive audio and video telecommunication system which permits real time communications between the patient (at the originating site) and provider (at the distant site) was utilized to provide this telehealth service.   Verbal consent was requested and obtained from Tatum Lala on this date, 09/27/24 for a telehealth visit.      CONTENT OF SESSION:   this was followup appointment (session 9). This was individual CBT session for coping with psychological impact of infertility and PMDD symptoms.  Tatum reported increased anxiety and distress associated with current stressor of moving houses. She and Jaspal get the keys to their new home today and she has been packing for weeks with minimal help/support. Time was spent on emotional  processing of impact of infertility and infertility treatment, particularly in terms of relationships and navigating social support (or lack of social support). Tatum noted she is also worried about adjustment to new home/city.  Time was spent on problem solving ways she can coordinate her schedule according to her priorities and values and set appropriate boundaries. She also described her use of appropriate cognitive restructuring coping skills- Provider gave positive feedback about this skill use. Provider reviewed psychoeducation on common emotional and social reactions to infertility/infertility treatment. Provider gave supportive counseling and normalized common emotional experiences.    Objective   Social History     Substance and Sexual Activity   Alcohol Use Not on file      Social History     Social History Narrative    Not on file        Assessment/Plan   Problem List Items  Addressed This Visit       Current moderate episode of major depressive disorder (Multi) - Primary     PLAN: Followup in 2-4 weeks for cbt for depression and coping with infertility.  Referral placed to Dr. Percy Gustafson for medication consult/management.  PRACTICE EXERCISES PROVIDED: read handout provided on behavioral activation. Read chapter provided. Complete values card sort. Read handout on DEAR MAN communication model.

## 2024-10-18 DIAGNOSIS — F43.10 PTSD (POST-TRAUMATIC STRESS DISORDER): ICD-10-CM

## 2024-10-18 RX ORDER — ALPRAZOLAM 0.5 MG/1
0.5 TABLET ORAL 3 TIMES DAILY PRN
Qty: 10 TABLET | Refills: 2 | Status: SHIPPED | OUTPATIENT
Start: 2024-10-18 | End: 2025-01-16

## 2024-11-22 ENCOUNTER — APPOINTMENT (OUTPATIENT)
Dept: OBSTETRICS AND GYNECOLOGY | Facility: CLINIC | Age: 44
End: 2024-11-22
Payer: COMMERCIAL

## 2024-11-22 VITALS — BODY MASS INDEX: 23.74 KG/M2 | HEIGHT: 63 IN

## 2024-11-22 DIAGNOSIS — Z79.890 MENOPAUSAL SYNDROME ON HORMONE REPLACEMENT THERAPY: ICD-10-CM

## 2024-11-22 DIAGNOSIS — N95.1 MENOPAUSAL SYNDROME ON HORMONE REPLACEMENT THERAPY: ICD-10-CM

## 2024-11-22 DIAGNOSIS — N95.1 PERIMENOPAUSAL: Primary | ICD-10-CM

## 2024-11-22 PROCEDURE — 1036F TOBACCO NON-USER: CPT | Performed by: NURSE PRACTITIONER

## 2024-11-22 PROCEDURE — 99214 OFFICE O/P EST MOD 30 MIN: CPT | Performed by: NURSE PRACTITIONER

## 2024-11-22 RX ORDER — ESTRADIOL 1 MG/1
1 TABLET ORAL DAILY
Qty: 30 TABLET | Refills: 11 | Status: SHIPPED | OUTPATIENT
Start: 2024-11-22 | End: 2024-11-22

## 2024-11-22 RX ORDER — PROGESTERONE 200 MG/1
CAPSULE ORAL
Qty: 14 CAPSULE | Refills: 11 | Status: SHIPPED | OUTPATIENT
Start: 2024-11-22

## 2024-11-22 RX ORDER — PROGESTERONE 200 MG/1
CAPSULE ORAL
Qty: 14 CAPSULE | Refills: 11 | Status: SHIPPED | OUTPATIENT
Start: 2024-11-22 | End: 2024-11-22

## 2024-11-22 RX ORDER — ESTRADIOL 1 MG/1
1 TABLET ORAL DAILY
Qty: 30 TABLET | Refills: 11 | Status: SHIPPED | OUTPATIENT
Start: 2024-11-22

## 2024-11-22 ASSESSMENT — ENCOUNTER SYMPTOMS
CONSTITUTIONAL NEGATIVE: 0
GASTROINTESTINAL NEGATIVE: 0
EYES NEGATIVE: 0
HEMATOLOGIC/LYMPHATIC NEGATIVE: 0
MUSCULOSKELETAL NEGATIVE: 0
CARDIOVASCULAR NEGATIVE: 0
ENDOCRINE NEGATIVE: 0
ALLERGIC/IMMUNOLOGIC NEGATIVE: 0
RESPIRATORY NEGATIVE: 0
PSYCHIATRIC NEGATIVE: 0
NEUROLOGICAL NEGATIVE: 0

## 2024-11-22 ASSESSMENT — PAIN SCALES - GENERAL: PAINLEVEL_OUTOF10: 0-NO PAIN

## 2024-11-22 NOTE — LETTER
November 22, 2024     [place]  [id number]  Department Of Ob/Gyn-Behavioral Medicine  [facility] [address]    Patient: Tatum Lala   YOB: 1980   Date of Visit: 11/22/2024       Dear Dr. Britni Blackwell, PhD:    Thank you for referring Tatum Lala to me for evaluation. Below are my notes for this consultation.  If you have questions, please do not hesitate to call me. I look forward to following your patient along with you.       Sincerely,     Justo Medrano, APRN-CNP      CC: Percy Gustafson MD  ______________________________________________________________________________________    Subjective  Patient ID: Tatum Lala is a 43 y.o. female who presents for Menopause.  HPI  Concerns:   H/o 10 rounds of IVF, considering egg donation in approximately 24 months    Menopausal age: perimenopausal     Any Contraindications to HT: personal h/o breast cancer, estrogen sensitive cancer, dementia, stroke, MI, VTE or inherited high risk for VTE, SCAD: none  h/o congenital heart disease (increased risk of DVT) none    contraception:  none  HT: none  use of OTC remedies: none  bioidenticals: none    Vaginal bleeding:  over the last 5 years menses have changed: cycles becoming shorter in frequency  VMS: yes  Sleep difficulties: yes  Mood changes: h/o PMDD, in the last 6 months started Prozac for 5 days prior to her menses which has been effective but more recently feels that the PMDD symptoms are starting sooner  Joint pain: none  Brain fog/difficulty concentrating: none    GSM: none  are you sexually active: yes  dyspareunia: none  decrease in desire: none  difficulty reaching orgasm:  none  Urinary Incontinence: none       Review of Systems    Objective  Physical Exam    Assessment/Plan  Diagnoses and all orders for this visit:  Perimenopausal  Menopausal syndrome on hormone replacement therapy  -     estradiol (Estrace) 1 mg tablet; Take 1 tablet (1 mg) by mouth once daily.  -     progesterone  (Prometrium) 200 mg capsule; Take one pill by mouth at bedtime for 14 days/month, starting 12/1    We discussed the s/s perimenopause and that women with a h/o PMDD are at an increased risk for a worsening of moods in perimenopause d/t the fluctuating hormones; we also discussed that the menstrual cycle shifts and women spend more time in the luteal phase in perimenopause which may also cause a worsening of PMDD    Decision for MHT; we discussed that she has no increased risk of cancer, CVD, or dementia while she is perimenopausal  She previously tolerated IVF hormone therapy very well although she states she previously had an AE to contraceptive hormones    She will contact me with an update on her symptoms       NIKKI Woodard 11/22/24 8:26 AM

## 2024-11-22 NOTE — PROGRESS NOTES
Subjective   Patient ID: Tatum Lala is a 43 y.o. female who presents for Menopause.  HPI  Concerns:   H/o 10 rounds of IVF, considering egg donation in approximately 24 months    Menopausal age: perimenopausal     Any Contraindications to HT: personal h/o breast cancer, estrogen sensitive cancer, dementia, stroke, MI, VTE or inherited high risk for VTE, SCAD: none  h/o congenital heart disease (increased risk of DVT) none    contraception:  none  HT: none  use of OTC remedies: none  bioidenticals: none    Vaginal bleeding:  over the last 5 years menses have changed: cycles becoming shorter in frequency  VMS: yes  Sleep difficulties: yes  Mood changes: h/o PMDD, in the last 6 months started Prozac for 5 days prior to her menses which has been effective but more recently feels that the PMDD symptoms are starting sooner  Joint pain: none  Brain fog/difficulty concentrating: none    GSM: none  are you sexually active: yes  dyspareunia: none  decrease in desire: none  difficulty reaching orgasm:  none  Urinary Incontinence: none       Review of Systems    Objective   Physical Exam    Assessment/Plan   Diagnoses and all orders for this visit:  Perimenopausal  Menopausal syndrome on hormone replacement therapy  -     estradiol (Estrace) 1 mg tablet; Take 1 tablet (1 mg) by mouth once daily.  -     progesterone (Prometrium) 200 mg capsule; Take one pill by mouth at bedtime for 14 days/month, starting 12/1    We discussed the s/s perimenopause and that women with a h/o PMDD are at an increased risk for a worsening of moods in perimenopause d/t the fluctuating hormones; we also discussed that the menstrual cycle shifts and women spend more time in the luteal phase in perimenopause which may also cause a worsening of PMDD    Decision for MHT; we discussed that she has no increased risk of cancer, CVD, or dementia while she is perimenopausal  She previously tolerated IVF hormone therapy very well although she states she  previously had an AE to contraceptive hormones    She will contact me with an update on her symptoms       KOKI Woodard-CNP 11/22/24 8:26 AM

## 2024-12-18 ENCOUNTER — APPOINTMENT (OUTPATIENT)
Dept: BEHAVIORAL HEALTH | Facility: CLINIC | Age: 44
End: 2024-12-18
Payer: COMMERCIAL

## 2024-12-18 DIAGNOSIS — F32.1 CURRENT MODERATE EPISODE OF MAJOR DEPRESSIVE DISORDER, UNSPECIFIED WHETHER RECURRENT (MULTI): Primary | ICD-10-CM

## 2024-12-18 PROCEDURE — 90837 PSYTX W PT 60 MINUTES: CPT | Performed by: PSYCHOLOGIST

## 2024-12-18 NOTE — PROGRESS NOTES
START TIME: 8:05   END TIME: 9  TOTAL TIME FACE TO FACE: 55mins    Subjective   Patient ID: Tatum Lala is a 44 y.o. female who presents for   Problem List Items Addressed This Visit       Current moderate episode of major depressive disorder (Multi) - Primary   .    Virtual or Telephone Consent    An interactive audio and video telecommunication system which permits real time communications between the patient (at the originating site) and provider (at the distant site) was utilized to provide this telehealth service.   Verbal consent was requested and obtained from Tatum Lala on this date, 12/18/24 for a telehealth visit.      this was followup appointment (session 10). This was individual CBT session for coping with psychological impact of infertility and PMDD symptoms.  Tatum reported improvement in mood and sleep. She met with Justo Medrano and started HRT and is finding it effective. She also reported positive adjustment to moving to new home in [place]. She is also starting a new job as  in January. She noted normative distress related to infertility and infertility treatment, but described appropriate use of coping skills including cognitive restructuring, values guided action, and behavioral activation. She also acknowledged potential interpersonal stressors, but has been managing them effectively with interpersonal boundary setting. Provider gave positive feedback about this skill use. Provider also reviewed psychoeducation on psychological components to menopause transition and impact of infertility. Provider gave supportive counseling and normalized common emotional experiences.    Objective   Social History     Substance and Sexual Activity   Alcohol Use Never      Social History     Social History Narrative    Not on file        Assessment/Plan   Problem List Items Addressed This Visit       Current moderate episode of major depressive disorder (Multi) - Primary     PLAN: Followup  in 2-4 weeks for cbt for depression and coping with infertility.  Referral placed to Dr. Percy Gustafson for medication consult/management.  PRACTICE EXERCISES PROVIDED: read handout provided on behavioral activation. Read chapter provided. Complete values card sort. Read handout on DEAR MAN communication model.

## 2024-12-30 DIAGNOSIS — Z79.890 MENOPAUSAL SYNDROME ON HORMONE REPLACEMENT THERAPY: ICD-10-CM

## 2024-12-30 DIAGNOSIS — N95.1 MENOPAUSAL SYNDROME ON HORMONE REPLACEMENT THERAPY: ICD-10-CM

## 2024-12-30 RX ORDER — PROGESTERONE 200 MG/1
200 CAPSULE ORAL DAILY
Qty: 30 CAPSULE | Refills: 11 | Status: SHIPPED | OUTPATIENT
Start: 2024-12-30

## 2025-01-02 ENCOUNTER — APPOINTMENT (OUTPATIENT)
Dept: BEHAVIORAL HEALTH | Facility: CLINIC | Age: 45
End: 2025-01-02
Payer: COMMERCIAL

## 2025-01-16 DIAGNOSIS — N63.10 MASS OF RIGHT BREAST, UNSPECIFIED QUADRANT: Primary | ICD-10-CM

## 2025-01-16 NOTE — PROGRESS NOTES
Pt contacted [facility] provider regarding right breast mass but she prefers to have the imaging done at , order placed

## 2025-01-23 ENCOUNTER — APPOINTMENT (OUTPATIENT)
Dept: BEHAVIORAL HEALTH | Facility: CLINIC | Age: 45
End: 2025-01-23
Payer: COMMERCIAL

## 2025-01-23 DIAGNOSIS — F32.1 CURRENT MODERATE EPISODE OF MAJOR DEPRESSIVE DISORDER, UNSPECIFIED WHETHER RECURRENT (MULTI): Primary | ICD-10-CM

## 2025-01-23 PROCEDURE — 90834 PSYTX W PT 45 MINUTES: CPT | Performed by: PSYCHOLOGIST

## 2025-01-23 NOTE — PROGRESS NOTES
START TIME: 8:05  END TIME: 8:45  TOTAL TIME FACE TO FACE: 40    Subjective   Patient ID: Tatum Lala is a 44 y.o. female who presents for   Problem List Items Addressed This Visit       Current moderate episode of major depressive disorder (Multi) - Primary   .    Virtual or Telephone Consent    An interactive audio and video telecommunication system which permits real time communications between the patient (at the originating site) and provider (at the distant site) was utilized to provide this telehealth service.   Verbal consent was requested and obtained from Tatum Lala on this date, 01/23/25 for a telehealth visit.      this was followup appointment (session 11). This was individual CBT session for coping with psychological impact of infertility and PMDD symptoms.      Tatum reported some distress in context of recent psychosocial stressors and social reactions to her experience of infertility and values. Her dog was diagnosed with a cancerous tumor. She started her new job last week and expressed feeling of mastery with this. Tatum discussed recent social interactions where she used assertive communication and appropriate limit setting, but has also felt disappointed by invalidating reactions. Provider gave positive feedback about this skill use. provider also reviewed psychoeducation on common psychological reactions to infertility treatment and reproductive loss. Tatum identified some thoughts about feeling like she ‘has no patience’, but time was spent on cognitive restructuring of this negative self-judgment thought.  Provider used Socratic questioning to facilitate cognitive restructuring of potential unrealistic or unhelpful cognitions and patient was able to engage in cognitive restructuring. Tatum was able to acknowledge a feeling of being resilient and having coped with difficult things.  Provider gave supportive counseling and normalized common emotional experiences.    Objective   Social  History     Substance and Sexual Activity   Alcohol Use Never      Social History     Social History Narrative    Not on file        Assessment/Plan   Problem List Items Addressed This Visit       Current moderate episode of major depressive disorder (Multi) - Primary     PLAN: Followup in 2-4 weeks for cbt for depression and coping with infertility.  Referral placed to Dr. Percy Gustafson for medication consult/management.  PRACTICE EXERCISES PROVIDED: read handout provided on behavioral activation. Read chapter provided. Complete values card sort. Read handout on DEAR MAN communication model.